# Patient Record
Sex: MALE | Race: WHITE | Employment: OTHER | URBAN - METROPOLITAN AREA
[De-identification: names, ages, dates, MRNs, and addresses within clinical notes are randomized per-mention and may not be internally consistent; named-entity substitution may affect disease eponyms.]

---

## 2022-02-25 ENCOUNTER — HOSPITAL ENCOUNTER (OUTPATIENT)
Dept: ULTRASOUND IMAGING | Age: 77
Discharge: HOME | End: 2022-02-25
Payer: OTHER GOVERNMENT

## 2022-02-25 DIAGNOSIS — D69.6 THROMBOCYTOPENIA (HCC): ICD-10-CM

## 2022-02-25 PROCEDURE — 76705 ECHO EXAM OF ABDOMEN: CPT

## 2023-09-13 ENCOUNTER — NURSING HOME VISIT (OUTPATIENT)
Dept: POST ACUTE CARE | Facility: EXTERNAL LOCATION | Age: 78
End: 2023-09-13
Payer: MEDICARE

## 2023-09-13 DIAGNOSIS — C25.9 MALIGNANT NEOPLASM OF PANCREAS, UNSPECIFIED LOCATION OF MALIGNANCY (MULTI): ICD-10-CM

## 2023-09-13 DIAGNOSIS — G89.3 CANCER ASSOCIATED PAIN: ICD-10-CM

## 2023-09-13 DIAGNOSIS — K21.9 GASTROESOPHAGEAL REFLUX DISEASE, UNSPECIFIED WHETHER ESOPHAGITIS PRESENT: ICD-10-CM

## 2023-09-13 DIAGNOSIS — K59.00 CONSTIPATION, UNSPECIFIED CONSTIPATION TYPE: ICD-10-CM

## 2023-09-13 DIAGNOSIS — I82.90 DEEP VEIN THROMBOSIS (DVT) OF NON-EXTREMITY VEIN, UNSPECIFIED CHRONICITY: ICD-10-CM

## 2023-09-13 DIAGNOSIS — M62.81 MUSCLE WEAKNESS (GENERALIZED): Primary | ICD-10-CM

## 2023-09-13 PROCEDURE — 99310 SBSQ NF CARE HIGH MDM 45: CPT | Performed by: NURSE PRACTITIONER

## 2023-09-14 ENCOUNTER — NURSING HOME VISIT (OUTPATIENT)
Dept: POST ACUTE CARE | Facility: EXTERNAL LOCATION | Age: 78
End: 2023-09-14
Payer: MEDICARE

## 2023-09-14 DIAGNOSIS — E11.9 TYPE 2 DIABETES MELLITUS WITHOUT COMPLICATION, UNSPECIFIED WHETHER LONG TERM INSULIN USE (MULTI): ICD-10-CM

## 2023-09-14 DIAGNOSIS — C25.9 MALIGNANT NEOPLASM OF PANCREAS, UNSPECIFIED LOCATION OF MALIGNANCY (MULTI): ICD-10-CM

## 2023-09-14 PROCEDURE — 99305 1ST NF CARE MODERATE MDM 35: CPT | Performed by: FAMILY MEDICINE

## 2023-09-14 NOTE — PROGRESS NOTES
Shivam Kohli is a 78 y.o. male with Chief Complaint of SNF ([facility]) H&P    Resident seen 23 -- MP    CC: LTC ([facility]) H&P    : 1945  LTC H&P done 23 (EPIC)  NKDA  Full Code    S: 79 yo man with pancreatic cancer metastatic to his bones & lungs, COPD/Asthma, GERD, HLD, DM admitted to LTC due to weakness and loss of ADLs. Not ready for hospice -- wants to try more therapy if pain can get under control. C/O pelvic and left knee pain.    Former smoker    O: VSS AFEB 144# Awake, alert, cachectic man in NAD. Chest cta. Heart rrr. Ext no c/c/e. Abd soft.    A/P:  # Pancreatic cancer with bone mets: dilaudid 4 mg q4 hr prn pain control. Duragesic patch 12 mcg/h. Lidocaine patch. Celebrex. Oaparib 150 mg 2 bid brought in by family.  # DM with neuropathy: gabapentin.  # Xarelto  # HTN: resume losartan 50 mg daily.  # Hx DVT: Xarelto  # CAD s/p CABG .    History reviewed. No pertinent surgical history.   Social History     Socioeconomic History    Marital status:      Spouse name: Not on file    Number of children: Not on file    Years of education: Not on file    Highest education level: Not on file   Occupational History    Not on file   Tobacco Use    Smoking status: Former     Types: Cigarettes    Smokeless tobacco: Never   Substance and Sexual Activity    Alcohol use: Not on file    Drug use: Not on file    Sexual activity: Not on file   Other Topics Concern    Not on file   Social History Narrative    Not on file     Social Determinants of Health     Financial Resource Strain: Not on file   Food Insecurity: Not on file   Transportation Needs: Not on file   Physical Activity: Not on file   Stress: Not on file   Social Connections: Not on file   Intimate Partner Violence: Not on file   Housing Stability: Not on file     History reviewed. No pertinent past medical history.   No family history on file.     Review of Systems   Constitutional:  Negative for chills, fatigue and fever.   HENT:   Negative for rhinorrhea and sore throat.    Eyes:  Negative for pain and redness.   Respiratory:  Negative for cough and shortness of breath.    Cardiovascular:  Negative for chest pain and palpitations.   Gastrointestinal:  Negative for abdominal pain and blood in stool.   Endocrine: Negative for polydipsia and polyuria.   Genitourinary:  Negative for dysuria and hematuria.   Musculoskeletal:  Positive for arthralgias, back pain and gait problem. Negative for neck stiffness.   Skin:  Negative for rash and wound.   Allergic/Immunologic: Negative for environmental allergies and food allergies.   Neurological:  Positive for weakness. Negative for headaches.   Hematological:  Negative for adenopathy. Does not bruise/bleed easily.   Psychiatric/Behavioral:  Negative for hallucinations and suicidal ideas.       There were no vitals taken for this visit.  Physical Exam  Vitals reviewed.   Constitutional:       General: He is not in acute distress.     Appearance: He is ill-appearing.      Comments: Cachectic man   HENT:      Head: Normocephalic and atraumatic.      Right Ear: Tympanic membrane normal.      Left Ear: Tympanic membrane normal.      Nose: No congestion or rhinorrhea.      Mouth/Throat:      Pharynx: No oropharyngeal exudate or posterior oropharyngeal erythema.   Eyes:      Extraocular Movements: Extraocular movements intact.      Conjunctiva/sclera: Conjunctivae normal.      Pupils: Pupils are equal, round, and reactive to light.   Cardiovascular:      Rate and Rhythm: Normal rate and regular rhythm.      Heart sounds: No murmur heard.     No friction rub. No gallop.   Pulmonary:      Effort: Pulmonary effort is normal.      Breath sounds: Normal breath sounds. No wheezing, rhonchi or rales.   Abdominal:      General: There is no distension.      Palpations: Abdomen is soft.      Tenderness: There is no abdominal tenderness. There is no guarding or rebound.   Musculoskeletal:         General: No swelling or  "deformity.      Cervical back: Normal range of motion and neck supple.      Right lower leg: No edema.      Left lower leg: No edema.   Skin:     Capillary Refill: Capillary refill takes less than 2 seconds.      Coloration: Skin is not jaundiced.      Findings: No rash.   Neurological:      General: No focal deficit present.      Mental Status: He is alert.      Motor: Weakness present.   Psychiatric:         Mood and Affect: Mood normal.         Behavior: Behavior normal.       No results found for: \"WBC\", \"HGB\", \"HCT\", \"MCV\", \"PLT\"  No results found for: \"CHOL\"  No results found for: \"HDL\"  No results found for: \"LDLCALC\"  No results found for: \"TRIG\"  No components found for: \"CHOLHDL\"  No results found for: \"HGBA1C\"    Assessment/Plan   Problem List Items Addressed This Visit       Type 2 diabetes mellitus without complication (CMS/HCC)    Malignant neoplasm of pancreas (CMS/HCC)       "

## 2023-09-14 NOTE — LETTER
Patient: Shivam Kohli  : 1945    Encounter Date: 2023    Shivam Kohli is a 78 y.o. male with Chief Complaint of SNF ([place]) H&P    Resident seen 23 -- MP    CC: LTC ([place]) H&P    : 1945  LTC H&P done 23 (EPIC)  NKDA  Full Code    S: 77 yo man with pancreatic cancer metastatic to his bones & lungs, COPD/Asthma, GERD, HLD, DM admitted to LTC due to weakness and loss of ADLs. Not ready for hospice -- wants to try more therapy if pain can get under control. C/O pelvic and left knee pain.    Former smoker    O: VSS AFEB 144# Awake, alert, cachectic man in NAD. Chest cta. Heart rrr. Ext no c/c/e. Abd soft.    A/P:  # Pancreatic cancer with bone mets: dilaudid 4 mg q4 hr prn pain control. Duragesic patch 12 mcg/h. Lidocaine patch. Celebrex. Oaparib 150 mg 2 bid brought in by family.  # DM with neuropathy: gabapentin.  # Xarelto  # HTN: resume losartan 50 mg daily.  # Hx DVT: Xarelto  # CAD s/p CABG .    History reviewed. No pertinent surgical history.   Social History     Socioeconomic History   • Marital status:      Spouse name: Not on file   • Number of children: Not on file   • Years of education: Not on file   • Highest education level: Not on file   Occupational History   • Not on file   Tobacco Use   • Smoking status: Former     Types: Cigarettes   • Smokeless tobacco: Never   Substance and Sexual Activity   • Alcohol use: Not on file   • Drug use: Not on file   • Sexual activity: Not on file   Other Topics Concern   • Not on file   Social History Narrative   • Not on file     Social Determinants of Health     Financial Resource Strain: Not on file   Food Insecurity: Not on file   Transportation Needs: Not on file   Physical Activity: Not on file   Stress: Not on file   Social Connections: Not on file   Intimate Partner Violence: Not on file   Housing Stability: Not on file     History reviewed. No pertinent past medical history.   No family history on file.     Review  of Systems   Constitutional:  Negative for chills, fatigue and fever.   HENT:  Negative for rhinorrhea and sore throat.    Eyes:  Negative for pain and redness.   Respiratory:  Negative for cough and shortness of breath.    Cardiovascular:  Negative for chest pain and palpitations.   Gastrointestinal:  Negative for abdominal pain and blood in stool.   Endocrine: Negative for polydipsia and polyuria.   Genitourinary:  Negative for dysuria and hematuria.   Musculoskeletal:  Positive for arthralgias, back pain and gait problem. Negative for neck stiffness.   Skin:  Negative for rash and wound.   Allergic/Immunologic: Negative for environmental allergies and food allergies.   Neurological:  Positive for weakness. Negative for headaches.   Hematological:  Negative for adenopathy. Does not bruise/bleed easily.   Psychiatric/Behavioral:  Negative for hallucinations and suicidal ideas.       There were no vitals taken for this visit.  Physical Exam  Vitals reviewed.   Constitutional:       General: He is not in acute distress.     Appearance: He is ill-appearing.      Comments: Cachectic man   HENT:      Head: Normocephalic and atraumatic.      Right Ear: Tympanic membrane normal.      Left Ear: Tympanic membrane normal.      Nose: No congestion or rhinorrhea.      Mouth/Throat:      Pharynx: No oropharyngeal exudate or posterior oropharyngeal erythema.   Eyes:      Extraocular Movements: Extraocular movements intact.      Conjunctiva/sclera: Conjunctivae normal.      Pupils: Pupils are equal, round, and reactive to light.   Cardiovascular:      Rate and Rhythm: Normal rate and regular rhythm.      Heart sounds: No murmur heard.     No friction rub. No gallop.   Pulmonary:      Effort: Pulmonary effort is normal.      Breath sounds: Normal breath sounds. No wheezing, rhonchi or rales.   Abdominal:      General: There is no distension.      Palpations: Abdomen is soft.      Tenderness: There is no abdominal tenderness. There  "is no guarding or rebound.   Musculoskeletal:         General: No swelling or deformity.      Cervical back: Normal range of motion and neck supple.      Right lower leg: No edema.      Left lower leg: No edema.   Skin:     Capillary Refill: Capillary refill takes less than 2 seconds.      Coloration: Skin is not jaundiced.      Findings: No rash.   Neurological:      General: No focal deficit present.      Mental Status: He is alert.      Motor: Weakness present.   Psychiatric:         Mood and Affect: Mood normal.         Behavior: Behavior normal.       No results found for: \"WBC\", \"HGB\", \"HCT\", \"MCV\", \"PLT\"  No results found for: \"CHOL\"  No results found for: \"HDL\"  No results found for: \"LDLCALC\"  No results found for: \"TRIG\"  No components found for: \"CHOLHDL\"  No results found for: \"HGBA1C\"    Assessment/Plan  Problem List Items Addressed This Visit       Type 2 diabetes mellitus without complication (CMS/HCC)    Malignant neoplasm of pancreas (CMS/HCC)         Electronically Signed By: Riaz Luo MD   9/16/23  7:12 PM  "

## 2023-09-16 ASSESSMENT — ENCOUNTER SYMPTOMS
SHORTNESS OF BREATH: 0
WOUND: 0
HALLUCINATIONS: 0
COUGH: 0
CHILLS: 0
BACK PAIN: 1
SORE THROAT: 0
DYSURIA: 0
FATIGUE: 0
BLOOD IN STOOL: 0
ADENOPATHY: 0
ABDOMINAL PAIN: 0
HEADACHES: 0
NECK STIFFNESS: 0
EYE REDNESS: 0
RHINORRHEA: 0
ARTHRALGIAS: 1
WEAKNESS: 1
FEVER: 0
EYE PAIN: 0
POLYDIPSIA: 0
HEMATURIA: 0
BRUISES/BLEEDS EASILY: 0
PALPITATIONS: 0

## 2023-09-18 ENCOUNTER — NURSING HOME VISIT (OUTPATIENT)
Dept: POST ACUTE CARE | Facility: EXTERNAL LOCATION | Age: 78
End: 2023-09-18
Payer: MEDICARE

## 2023-09-18 DIAGNOSIS — I10 BENIGN HYPERTENSION: ICD-10-CM

## 2023-09-18 DIAGNOSIS — E11.42 DIABETIC POLYNEUROPATHY ASSOCIATED WITH TYPE 2 DIABETES MELLITUS (MULTI): ICD-10-CM

## 2023-09-18 DIAGNOSIS — C25.9 MALIGNANT NEOPLASM OF PANCREAS, UNSPECIFIED LOCATION OF MALIGNANCY (MULTI): ICD-10-CM

## 2023-09-18 PROCEDURE — 99309 SBSQ NF CARE MODERATE MDM 30: CPT | Performed by: FAMILY MEDICINE

## 2023-09-18 NOTE — LETTER
Patient: Shivam Kohli  : 1945    Encounter Date: 2023    Resident seen 23 -- MP    CC: [facility] ([place]) H&P    : 1945  [facility] H&P done 23 (EPIC)  NKDA  Full Code    S: 79 yo man with pancreatic cancer metastatic to his bones & lungs, COPD/Asthma, GERD, HLD, DM with progerssive weakness and loss of ADLs. No longer wants to participate in therapy. Requests comfort care. EVELINA Fairchild present during this conversation. C/O pelvic and left knee pain.    23 20 minutes spent in conference with dtr Marianela, [facility] coordinator Isabel Watters, and EVELINA MCLEAN to discuss benefits of Hospice and 2-3 week estimated prognosis.    O: VSS AFEB 144# (23) Awake, alert, cachectic man in NAD. Chest cta. Heart rrr. Ext no c/c/e. Abd soft.    A/P:  # Pancreatic cancer with bone mets: continue dilaudid 4 mg q4 hr prn pain control. Increase Duragesic patch to 25 mcg/h. Lidocaine patch. Celebrex. Oaparib 150 mg 2 bid brought in by family. Consult [facility].  # DM with neuropathy: gabapentin.  # Xarelto  # HTN: resume losartan 50 mg daily.  # Hx DVT: Xarelto  # CAD s/p CABG .      Electronically Signed By: Riaz Luo MD   23  1:42 PM

## 2023-09-25 PROBLEM — E11.42 DIABETIC POLYNEUROPATHY ASSOCIATED WITH TYPE 2 DIABETES MELLITUS (MULTI): Status: ACTIVE | Noted: 2023-09-25

## 2023-09-25 PROBLEM — I10 BENIGN HYPERTENSION: Status: ACTIVE | Noted: 2023-09-25

## 2023-09-25 NOTE — PROGRESS NOTES
Resident seen 23 -- MP    CC: [facility] ([place]) H&P    : 1945  LT H&P done 23 (EPIC)  NKDA  Full Code    S: 79 yo man with pancreatic cancer metastatic to his bones & lungs, COPD/Asthma, GERD, HLD, DM with progerssive weakness and loss of ADLs. No longer wants to participate in therapy. Requests comfort care. EVELINA Fairchild present during this conversation. C/O pelvic and left knee pain.    23 20 minutes spent in conference with dtmalachi Bear, [facility] coordinator Isabel Watters, and EVELINA MCLEAN to discuss benefits of Hospice and 2-3 week estimated prognosis.    O: VSS AFEB 144# (23) Awake, alert, cachectic man in NAD. Chest cta. Heart rrr. Ext no c/c/e. Abd soft.    A/P:  # Pancreatic cancer with bone mets: continue dilaudid 4 mg q4 hr prn pain control. Increase Duragesic patch to 25 mcg/h. Lidocaine patch. Celebrex. Oaparib 150 mg 2 bid brought in by family. Consult [facility].  # DM with neuropathy: gabapentin.  # Xarelto  # HTN: resume losartan 50 mg daily.  # Hx DVT: Xarelto  # CAD s/p CABG .

## 2023-10-06 NOTE — PROGRESS NOTES
*Provider Impression*    Patient is an 78 year old male who is seen today for management of multiple medical problems       #Weakness - PT/OT  #Pancreatic cancer / Cancer pain - acetaminophen 650mg q4h PRN, gabapentin 300mg TID, fentanyl 12mcg q72h, Percocet 5/325mg q4h PRN, olaparib 300mg BID, celebrex 100mg daily PRN, add lidocaine 4% patch daily  #DVT - xarelto 20mg daily  #GERD / Constipation - senna-S 8.6/50mg BID, omeprazole 20mg daily  #ACP - Full Code  Follow up as needed      *Chief Complaint*  pancreatic cancer     *History of Present Illness*    Patient is an 77 y/o male w/ PMH as below who presented to the ED w/ severe back pain. During previous admission 1 month prior Imaging showed distant metastases to back, lung, ribs and liver, including vertebral fracture 2/2 metastases. He underwent kyphoplasty on 8/16 and was d/c to [facility]. He had worsening of the pain starting 4 days prior to admission. He was admitted for pain control. Underwent palliative radiation. He was stabilized then d/c to [facility] for rehab.    He is seen sitting up in his room today and reports pain 10/10, radiating down his left leg, he was sitting on the edge of the bed, mostly pain w/ sitting, 0/10, numbness, tingling, paresthesias, some sciatica, no focal weakness, no b/b incontinence, no CP, SOB, cough, n/v, constipation, diarrhea, LUTS, or any other new c/o presently.     [place] - NKA  PMH - CAD, HTN, HLD, asthma, GERD, prostate CA, DVT  PSH - CABG,  kyphoplasty, TURP  FH - Father had lung cancer  SocHx -  Former smoker, Occasional EtOH    *Review of Systems*  All other systems reviewed are negative except as noted in the HPI     *Vital Signs*   Date: 9/13/23  - T: 97.2  P:   R: 21  BP: 157/92  SpO2: 92% on RA     *Results / Data*  CBC - Date: 9/7/23  WBC: 6.0  HGB: 10.1  HCT: 32.4  PLT: 135 ;   BMP - Date: 9/7/23  Na: 137  K: 4.2  Cl: 106  CO2: 26  BUN: 17  Cr: 0.9  Glu: 90  Ca:   ;   LFT - Date:   AST:   ALT:    ALP:   TBili:   ;   Coags - Date:   INR:   PT:    *Physical Exam*  Gen: (+) NAD, (+) well-appearing  HEENT: (+) normocephalic, (+) MMM  Neck: (+) supple  Lungs: (+) CTAB, (-) wheezes, (-) rales, (-) rhonchi  Heart: (+) RRR, (+) S1 S2, (-) murmurs  Pulses: (+) palpable  Abd: (+) soft, (+) NT, (+) ND, (+) BS+  Ext: (-) edema, (-) deformity  MSK: (-) joint swelling  Skin: (+) warm, (+) dry, (-) rash  Neuro: (+) follows commands, (-) tremor, (+) alert